# Patient Record
Sex: FEMALE | Race: WHITE | NOT HISPANIC OR LATINO
[De-identification: names, ages, dates, MRNs, and addresses within clinical notes are randomized per-mention and may not be internally consistent; named-entity substitution may affect disease eponyms.]

---

## 2019-05-06 PROBLEM — Z00.00 ENCOUNTER FOR PREVENTIVE HEALTH EXAMINATION: Status: ACTIVE | Noted: 2019-05-06

## 2019-05-08 ENCOUNTER — APPOINTMENT (OUTPATIENT)
Dept: GYNECOLOGIC ONCOLOGY | Facility: CLINIC | Age: 29
End: 2019-05-08
Payer: COMMERCIAL

## 2019-05-08 VITALS
DIASTOLIC BLOOD PRESSURE: 75 MMHG | OXYGEN SATURATION: 100 % | WEIGHT: 109.5 LBS | SYSTOLIC BLOOD PRESSURE: 116 MMHG | HEIGHT: 64 IN | HEART RATE: 70 BPM | BODY MASS INDEX: 18.69 KG/M2

## 2019-05-08 DIAGNOSIS — B97.7 PAPILLOMAVIRUS AS THE CAUSE OF DISEASES CLASSIFIED ELSEWHERE: ICD-10-CM

## 2019-05-08 DIAGNOSIS — Z78.9 OTHER SPECIFIED HEALTH STATUS: ICD-10-CM

## 2019-05-08 DIAGNOSIS — R87.612 LOW GRADE SQUAMOUS INTRAEPITHELIAL LESION ON CYTOLOGIC SMEAR OF CERVIX (LGSIL): ICD-10-CM

## 2019-05-08 PROCEDURE — 99205 OFFICE O/P NEW HI 60 MIN: CPT | Mod: 25

## 2019-05-08 PROCEDURE — 57456 ENDOCERV CURETTAGE W/SCOPE: CPT

## 2019-05-08 RX ORDER — HUMAN PAPILLOMAVIRUS 9-VALENT VACCINE, RECOMBINANT 30; 40; 60; 40; 20; 20; 20; 20; 20 UG/.5ML; UG/.5ML; UG/.5ML; UG/.5ML; UG/.5ML; UG/.5ML; UG/.5ML; UG/.5ML; UG/.5ML
INJECTION, SUSPENSION INTRAMUSCULAR
Qty: 1 | Refills: 2 | Status: ACTIVE | COMMUNITY
Start: 2019-05-08 | End: 1900-01-01

## 2019-05-08 RX ORDER — NORGESTIMATE AND ETHINYL ESTRADIOL 7DAYSX3 LO
KIT ORAL
Refills: 0 | Status: ACTIVE | COMMUNITY

## 2019-05-09 NOTE — HISTORY OF PRESENT ILLNESS
[FreeTextEntry1] : Problem\par 1)LGSIL\par 2)HPV/HR Positive\par \par Previous Therapy\par 1)Pap 4/17/2019\par    a)LGSIL, HPV/HR Positive \par \par

## 2019-05-09 NOTE — CHIEF COMPLAINT
[FreeTextEntry1] : New patient consult. 28 y.o patient referred by Dr. Vaughn presents today for LGSIL and HPV positive pap. Needs colposcopy. \par \par GynHx: previously normal PAPs, regular gyn care. LMP 5/6/19. On OCPS\par ObHx: N/A\par PmHx: None\par SurgHx: None\par Meds: OCPs\par All: Sulfa\par SocHx: works in [employer] no toxic habits\par FamHx: no cancers\par

## 2019-05-09 NOTE — ASSESSMENT
[FreeTextEntry1] : With the aid of diagrams we reviewed the findings in detail.  We reviewed HPV its pathogenesis and the implications of an abnormal cervical cytology and the pathogenesis of dysplasia in detail.  The risk of invasive cervical cancer in women with a finding of low-grade squamous intraepithelial lesions (LSIL) is very low. Risk of MORA 2 is approximately 20% and MORA-3 is approximately 5-10%. \par \par ASCCP guidelines were reviewed with the patient. In this age group with LGSIL and high-risk HPV colposcopy should be performed.  If the result is consistent with MORA-2 or 3, or there is an unsatisfactory colposcopy, conization of the cervix as indicated. If, MORA -1 is found, observation is advised rather than treatment. If MORA 1 persists for two or more years, conization is also recommended.  This has been discussed with the patient in detail. She indicates her understanding.  We will contact her with the results of the biopsies once they're available.\par \par [] Follow up ECC\par [] Rx Gardasil\par \par If low-grade or normal ECC, patient to follow up with Dr. Vaughn for routine PAP/cotesting in 1 year. \par

## 2019-05-09 NOTE — PAST MEDICAL HISTORY
[Menarche Age ____] : age at menarche was [unfilled] [Definite ___ (Date)] : the last menstrual period was [unfilled] [Oral Contraceptives] : uses oral contraceptives [Total Preg ___] : G[unfilled] [de-identified] : "used for a few years"

## 2019-05-09 NOTE — PROCEDURE
[Colposcopy] : colposcopy [HPV high risk] : PCR positive for high risk HPV [Patient] : the patient [LGSIL] : low grade squamous intraepithelial lesion [SCJ Fully Visualized] : the squamocolumnar junction was fully visualized [Normal Staining] : normal staining [No Abnormalities] : no abnormalities seen [ECC Done] : an Endocervical curettage was performed.  [No Complications] : none [Tolerated Well] : the patient tolerated the procedure well [Post procedure instructions and information given] : post procedure instructions and information given and reviewed with patient. [Vaginal Pap Performed] : no vaginal pap smear was performed [Cervical Pap Performed] : a cervical pap smear was not performed [Biopsies Taken: # ___] : no biopsies were taken of the cervix [de-identified] : Prominent ectropion, no evidence of HPV changes or dyplasia

## 2019-05-09 NOTE — PHYSICAL EXAM
[Normal] : Anus and perineum: Normal sphincter tone, no masses, no prolapse. [de-identified] : See colpo note